# Patient Record
Sex: FEMALE | URBAN - METROPOLITAN AREA
[De-identification: names, ages, dates, MRNs, and addresses within clinical notes are randomized per-mention and may not be internally consistent; named-entity substitution may affect disease eponyms.]

---
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
## 2020-08-28 NOTE — LETTER
Re: Stephanie (López) Princess          To whom it may concern,    López is a patient of mine, and is under my close professional care.  Patient is traveling to [place] and has need to travel with needles and syringes for a medication that they are taking.  I have prescribed this medication and it is necessary for patient to continue on it and thus they must travel with it.    Patient also suffers from anxiety especially associated with flying and will need to take some anxiety medicine prior to flying.  These will be tablets.    If you have any questions about this patient's care please feel free to contact our clinic at the phone number above.    Sincerely,     Chinedu Kaur, DO                 [facility] [employer]